# Patient Record
Sex: MALE | Race: WHITE | NOT HISPANIC OR LATINO | URBAN - METROPOLITAN AREA
[De-identification: names, ages, dates, MRNs, and addresses within clinical notes are randomized per-mention and may not be internally consistent; named-entity substitution may affect disease eponyms.]

---

## 2024-05-30 ENCOUNTER — OFFICE VISIT (OUTPATIENT)
Dept: URGENT CARE | Facility: CLINIC | Age: 34
End: 2024-05-30
Payer: COMMERCIAL

## 2024-05-30 VITALS
OXYGEN SATURATION: 97 % | WEIGHT: 209.31 LBS | TEMPERATURE: 97 F | HEIGHT: 72 IN | DIASTOLIC BLOOD PRESSURE: 75 MMHG | SYSTOLIC BLOOD PRESSURE: 123 MMHG | HEART RATE: 74 BPM | BODY MASS INDEX: 28.35 KG/M2 | RESPIRATION RATE: 14 BRPM

## 2024-05-30 DIAGNOSIS — T63.444A LOCAL REACTION TO BEE STING, UNDETERMINED INTENT, INITIAL ENCOUNTER: ICD-10-CM

## 2024-05-30 DIAGNOSIS — T63.464A WASP STING, UNDETERMINED INTENT, INITIAL ENCOUNTER: Primary | ICD-10-CM

## 2024-05-30 PROCEDURE — 99203 OFFICE O/P NEW LOW 30 MIN: CPT | Mod: S$GLB,,, | Performed by: NURSE PRACTITIONER

## 2024-05-30 RX ORDER — PREDNISONE 20 MG/1
60 TABLET ORAL
Status: COMPLETED | OUTPATIENT
Start: 2024-05-30 | End: 2024-05-30

## 2024-05-30 RX ADMIN — PREDNISONE 60 MG: 20 TABLET ORAL at 12:05

## 2024-05-30 NOTE — LETTER
May 30, 2024      [facility] [address] [place] [address]  Phone: [phone]  Fax: [phone]       Patient: Pete Ly   YOB: 1990  Date of Visit: 05/30/2024    To Whom It May Concern:    Ramila Ly  was at [facility] on 05/30/2024. The patient may return to work/school on 05/30/2024 with no restrictions. If you have any questions or concerns, or if I can be of further assistance, please do not hesitate to contact me.    Sincerely,     MEHREEN Wilson

## 2024-05-30 NOTE — PROGRESS NOTES
"Subjective:      Patient ID: Pete Ly is a 34 y.o. male.    Vitals:  height is 5' 11.85" (1.825 m) and weight is 94.9 kg (209 lb 4.9 oz). His tympanic temperature is 96.8 °F (36 °C). His blood pressure is 123/75 and his pulse is 74. His respiration is 14 and oxygen saturation is 97%.     Chief Complaint: Insect Bite (Left hand)    Patient presents with insect bite on left hand. States he was bitten yesterday afternoon. Very swollen this AM. Has improved slightly throughout the day. Took benadryl. Concerned because he works with his hands.    Insect Bite  This is a new problem. The current episode started yesterday. The problem occurs constantly. The problem has been gradually improving. Pertinent negatives include no abdominal pain, anorexia, arthralgias, change in bowel habit, chest pain, chills, congestion, coughing, diaphoresis, fatigue, fever, headaches, joint swelling, myalgias, nausea, neck pain, numbness, rash, sore throat, swollen glands, urinary symptoms, vertigo, visual change, vomiting or weakness. Nothing aggravates the symptoms. Treatments tried: OTC benadryl. The treatment provided no relief.       Constitution: Negative for chills, sweating, fatigue and fever.   HENT:  Negative for congestion and sore throat.    Neck: Negative for neck pain.   Cardiovascular:  Negative for chest pain.   Respiratory:  Negative for cough.    Gastrointestinal:  Negative for abdominal pain, nausea and vomiting.   Musculoskeletal:  Negative for joint pain, joint swelling and muscle ache.   Skin:  Positive for wound and erythema. Negative for rash.   Neurological:  Negative for history of vertigo, headaches and numbness.   Hematologic/Lymphatic: Negative for trouble clotting.      Objective:     Physical Exam   Constitutional: He is oriented to person, place, and time.   HENT:   Head: Normocephalic and atraumatic.   Cardiovascular: Normal rate.   Pulmonary/Chest: Effort normal. No respiratory distress. "   Abdominal: Normal appearance.   Musculoskeletal:         General: Swelling present.   Neurological: He is alert and oriented to person, place, and time.   Skin: Skin is warm and dry. erythema         Comments: Left hand swollen erythema dorsal portion. No signs of infection. No drainage. NV intact   Nursing note and vitals reviewed.      Assessment:     1. Wasp sting, undetermined intent, initial encounter    2. Local reaction to bee sting, undetermined intent, initial encounter        Plan:       Wasp sting, undetermined intent, initial encounter  -     predniSONE tablet 60 mg    Local reaction to bee sting, undetermined intent, initial encounter  -     predniSONE tablet 60 mg      If symptoms worsen or fail to improve, follow-up with primary care doctor or nearest ER. After visit summary given and discussed. Patient verbalized understanding and agrees with treatment plan. Patient remained stable and was discharged in no acute distress.